# Patient Record
Sex: MALE | Race: WHITE | NOT HISPANIC OR LATINO | Employment: FULL TIME | URBAN - METROPOLITAN AREA
[De-identification: names, ages, dates, MRNs, and addresses within clinical notes are randomized per-mention and may not be internally consistent; named-entity substitution may affect disease eponyms.]

---

## 2021-02-03 ENCOUNTER — OFFICE VISIT (OUTPATIENT)
Dept: FAMILY MEDICINE | Facility: CLINIC | Age: 36
End: 2021-02-03

## 2021-02-03 VITALS
DIASTOLIC BLOOD PRESSURE: 90 MMHG | BODY MASS INDEX: 33.67 KG/M2 | RESPIRATION RATE: 20 BRPM | SYSTOLIC BLOOD PRESSURE: 124 MMHG | WEIGHT: 248.6 LBS | HEART RATE: 80 BPM | TEMPERATURE: 98.4 F | HEIGHT: 72 IN

## 2021-02-03 DIAGNOSIS — M54.41 ACUTE RIGHT-SIDED LOW BACK PAIN WITH RIGHT-SIDED SCIATICA: Primary | ICD-10-CM

## 2021-02-03 DIAGNOSIS — Z76.89 HEALTH CARE HOME: ICD-10-CM

## 2021-02-03 DIAGNOSIS — K52.9 CHRONIC DIARRHEA: ICD-10-CM

## 2021-02-03 DIAGNOSIS — Z71.89 ACP (ADVANCE CARE PLANNING): ICD-10-CM

## 2021-02-03 PROCEDURE — 99203 OFFICE O/P NEW LOW 30 MIN: CPT | Performed by: PHYSICIAN ASSISTANT

## 2021-02-03 RX ORDER — DIPHENHYDRAMINE HCL 25 MG
25 TABLET ORAL 3 TIMES DAILY
COMMUNITY
Start: 2021-02-03 | End: 2021-03-15

## 2021-02-03 RX ORDER — CYCLOBENZAPRINE HCL 10 MG
10 TABLET ORAL 3 TIMES DAILY PRN
Qty: 30 TABLET | Refills: 0 | Status: SHIPPED | OUTPATIENT
Start: 2021-02-03 | End: 2021-03-14

## 2021-02-03 RX ORDER — METHYLPREDNISOLONE 4 MG
TABLET, DOSE PACK ORAL
Qty: 21 TABLET | Refills: 0 | Status: SHIPPED | OUTPATIENT
Start: 2021-02-03 | End: 2021-03-22

## 2021-02-03 RX ORDER — LOPERAMIDE HYDROCHLORIDE 2 MG/1
2 TABLET ORAL 4 TIMES DAILY PRN
COMMUNITY
Start: 2021-02-03 | End: 2021-03-15

## 2021-02-03 SDOH — HEALTH STABILITY: MENTAL HEALTH: HOW OFTEN DO YOU HAVE A DRINK CONTAINING ALCOHOL?: 2-3 TIMES A WEEK

## 2021-02-03 SDOH — HEALTH STABILITY: MENTAL HEALTH: HOW OFTEN DO YOU HAVE 6 OR MORE DRINKS ON ONE OCCASION?: LESS THAN MONTHLY

## 2021-02-03 SDOH — HEALTH STABILITY: MENTAL HEALTH: HOW MANY STANDARD DRINKS CONTAINING ALCOHOL DO YOU HAVE ON A TYPICAL DAY?: 1 OR 2

## 2021-02-03 ASSESSMENT — MIFFLIN-ST. JEOR: SCORE: 2082.7

## 2021-02-03 NOTE — NURSING NOTE
Also to establish care.    Questioned patient about current smoking habits.  Pt. quit smoking some time ago.  PULSE regular  My Chart:   CLASSIFICATION OF OVERWEIGHT AND OBESITY BY BMI                        Obesity Class           BMI(kg/m2)  Underweight                                    < 18.5  Normal                                         18.5-24.9  Overweight                                     25.0-29.9  OBESITY                     I                  30.0-34.9                             II                 35.0-39.9  EXTREME OBESITY             III                >40                            Patient's  BMI Body mass index is 34.19 kg/m .  http://hin.nhlbi.nih.gov/menuplanner/menu.cgi  Pre-visit planning  Immunizations - needs Tdap  Colonoscopy -   Mammogram -   Asthma -   PHQ9 -    MCKENNA-7 -

## 2021-02-03 NOTE — PATIENT INSTRUCTIONS
Symptoms consistent with right low back muscle strain. Informed that we cannot rule out a disc injury without MRI. For now, recommended trial of cyclobenzaprine (Flexaril) muscle relaxant that pt can take up to 3 times daily. Warned them of possible side effects, including drowsiness, and no driving if drowsy. By tomorrow if he is not seeing improvement, recommended he start Medrol dose pack (steroid). Take with food. Follow instructions on package. Warned of side effects like drowsiness, jitteriness, nausea, diarrhea, constipation, weight changes, irritability, mood swings, and to contact me if noted.     Contact me in 1 week if not significantly better, or sooner with worsening. Would refer to ortho specialist at that time. Can contact me later this week if needing better pain control.     Will submit referral to [facility] for work-up on GI issues.     Deferred tetanus update today until future appt. Recommend return for fasting physical in 2-3 months.

## 2021-02-03 NOTE — PROGRESS NOTES
"CC: New patient, establish, back pain    History:  Back pain:  Starting this past summer, Nirav has been noticing more frequent tightening across lower back, where he could take ibuprofen, stretch, rest,  and seemed to go away. Now in the past 2 weeks, has been having more intense sharp stabbing pain on right low back at waistband. Is there to some degree constantly. Bending over makes it worse, as well as starting up after sitting for longer periods of time with get some numbness/tingling down lateral right thigh. Was relatively tolerable and was feeling better, but then this past weekend went and helped his brother move, and this exacerbated it to worse than it had ever been. Has been taking 1 Aleve daily, as he tries to avoid medication if possible. Has not been doing any heating, icing. Has been doing some gentle stretching.     Chronic diarrhea:  Has had somewhat persistent diarrhea for the past 15 years. Has tried gluten free, dairy free without relief. Takes loperamide daily, but occasionally will get constipated with this. Denies any blood. Has never seen a GI provider for this.     PMH, MEDICATIONS, ALLERGIES, SOCIAL AND FAMILY HISTORY in [facility] and reviewed by me personally.    ROS negative other than the symptoms noted above in the HPI.    Examination   BP (!) 124/90 (BP Location: Left arm, Patient Position: Chair, Cuff Size: Adult Large)   Pulse 80   Temp 98.4  F (36.9  C) (Oral)   Resp 20   Ht 1.816 m (5' 11.5\")   Wt 112.8 kg (248 lb 9.6 oz)   BMI 34.19 kg/m       Constitutional: Sitting comfortably, in no acute distress. Vital signs noted  Neck:  no adenopathy, trachea midline and normal to palpation, thyroid normal to palpation  Cardiovascular:  regular rate and rhythm, no murmurs, clicks, or gallops  Respiratory:  normal respiratory rate and rhythm, lungs clear to auscultation   M/S: ROM of back intact. Pain recreated with full flexion, right rotation, and right lateral lean. Mild tenderness to " palpation of Paraspinous muscles L4-L5 level.   NEURO:  muscle strength normal, sensation to light touch and pinprick normal, reflexes normal and symmetric  SKIN: No jaundice/pallor/rash.   Psychiatric: mentation appears normal and affect normal/bright      A/P    ICD-10-CM    1. Acute right-sided low back pain with right-sided sciatica  M54.41 cyclobenzaprine (FLEXERIL) 10 MG tablet     methylPREDNISolone (MEDROL DOSEPAK) 4 MG tablet therapy pack   2. Chronic diarrhea  K52.9 GASTROENTEROLOGY ADULT REF CONSULT ONLY   3. ACP (advance care planning)  Z71.89    4. Health Care Home  Z76.89        DISCUSSION:  Symptoms consistent with right low back muscle strain. Informed that we cannot rule out a disc injury without MRI. For now, recommended trial of cyclobenzaprine (Flexaril) muscle relaxant that pt can take up to 3 times daily. Warned him of possible side effects, including drowsiness, and no driving if drowsy. By tomorrow if he is not seeing improvement, recommended he start Medrol dose pack (steroid). Take with food. Follow instructions on package. Warned of side effects like drowsiness, jitteriness, nausea, diarrhea, constipation, weight changes, irritability, mood swings, and to contact me if noted.     Contact me in 1 week if not significantly better, or sooner with worsening. Would refer to ortho specialist at that time. Can contact me later this week if needing better pain control.     Will submit referral to [facility] for work-up on GI issues.     Deferred tetanus update today until future appt to avoid further symptoms. Recommend return for fasting physical in 2-3 months.     follow up visit: As needed    [facility]

## 2021-03-14 ENCOUNTER — HOSPITAL ENCOUNTER (EMERGENCY)
Facility: CLINIC | Age: 36
Discharge: HOME | End: 2021-03-14
Attending: EMERGENCY MEDICINE | Admitting: EMERGENCY MEDICINE
Payer: COMMERCIAL

## 2021-03-14 VITALS
SYSTOLIC BLOOD PRESSURE: 143 MMHG | OXYGEN SATURATION: 97 % | TEMPERATURE: 97.4 F | RESPIRATION RATE: 18 BRPM | DIASTOLIC BLOOD PRESSURE: 80 MMHG | HEART RATE: 77 BPM

## 2021-03-14 DIAGNOSIS — M54.41 ACUTE RIGHT-SIDED LOW BACK PAIN WITH RIGHT-SIDED SCIATICA: ICD-10-CM

## 2021-03-14 PROCEDURE — 250N000013 HC RX MED GY IP 250 OP 250 PS 637: Performed by: EMERGENCY MEDICINE

## 2021-03-14 PROCEDURE — 250N000011 HC RX IP 250 OP 636: Performed by: EMERGENCY MEDICINE

## 2021-03-14 PROCEDURE — 96372 THER/PROPH/DIAG INJ SC/IM: CPT | Performed by: EMERGENCY MEDICINE

## 2021-03-14 PROCEDURE — 99283 EMERGENCY DEPT VISIT LOW MDM: CPT

## 2021-03-14 RX ORDER — KETOROLAC TROMETHAMINE 15 MG/ML
15 INJECTION, SOLUTION INTRAMUSCULAR; INTRAVENOUS ONCE
Status: COMPLETED | OUTPATIENT
Start: 2021-03-14 | End: 2021-03-14

## 2021-03-14 RX ORDER — IBUPROFEN 800 MG/1
800 TABLET, FILM COATED ORAL EVERY 8 HOURS PRN
Qty: 60 TABLET | Refills: 0 | Status: SHIPPED | OUTPATIENT
Start: 2021-03-14 | End: 2021-03-22

## 2021-03-14 RX ORDER — LIDOCAINE 4 G/G
1 PATCH TOPICAL ONCE
Status: DISCONTINUED | OUTPATIENT
Start: 2021-03-14 | End: 2021-03-14 | Stop reason: HOSPADM

## 2021-03-14 RX ORDER — DIAZEPAM 2 MG
2 TABLET ORAL ONCE
Status: COMPLETED | OUTPATIENT
Start: 2021-03-14 | End: 2021-03-14

## 2021-03-14 RX ORDER — LIDOCAINE 50 MG/G
1 PATCH TOPICAL EVERY 24 HOURS
Qty: 10 PATCH | Refills: 0 | Status: SHIPPED | OUTPATIENT
Start: 2021-03-14 | End: 2022-04-15

## 2021-03-14 RX ORDER — ACETAMINOPHEN 500 MG
1000 TABLET ORAL ONCE
Status: COMPLETED | OUTPATIENT
Start: 2021-03-14 | End: 2021-03-14

## 2021-03-14 RX ORDER — CYCLOBENZAPRINE HCL 10 MG
10 TABLET ORAL ONCE
Status: COMPLETED | OUTPATIENT
Start: 2021-03-14 | End: 2021-03-14

## 2021-03-14 RX ORDER — CYCLOBENZAPRINE HCL 10 MG
5-10 TABLET ORAL 3 TIMES DAILY PRN
Qty: 20 TABLET | Refills: 0 | Status: SHIPPED | OUTPATIENT
Start: 2021-03-14 | End: 2022-04-15

## 2021-03-14 RX ORDER — DIAZEPAM 5 MG
5 TABLET ORAL EVERY 6 HOURS PRN
Qty: 12 TABLET | Refills: 0 | Status: SHIPPED | OUTPATIENT
Start: 2021-03-14 | End: 2021-03-16

## 2021-03-14 RX ADMIN — DIAZEPAM 2 MG: 2 TABLET ORAL at 07:54

## 2021-03-14 RX ADMIN — ACETAMINOPHEN 1000 MG: 500 TABLET, FILM COATED ORAL at 07:54

## 2021-03-14 RX ADMIN — LIDOCAINE 1 PATCH: 560 PATCH PERCUTANEOUS; TOPICAL; TRANSDERMAL at 08:45

## 2021-03-14 RX ADMIN — CYCLOBENZAPRINE HYDROCHLORIDE 10 MG: 10 TABLET, FILM COATED ORAL at 07:54

## 2021-03-14 RX ADMIN — KETOROLAC TROMETHAMINE 15 MG: 15 INJECTION, SOLUTION INTRAMUSCULAR; INTRAVENOUS at 07:54

## 2021-03-14 ASSESSMENT — ENCOUNTER SYMPTOMS
ABDOMINAL PAIN: 0
COUGH: 0
ARTHRALGIAS: 1
FEVER: 0

## 2021-03-14 NOTE — DISCHARGE INSTRUCTIONS
Start by taking ibuprofen 800 mg 3 times a day with meals.  You may also use lidocaine patches for 12 hours at a time but please allow for 12 hours afterwards before applying a new patch.  You may also use Flexeril 3 times a day to assist with muscle spasm.  Valium can be used at night or when relaxing at home for muscle spasms and to help you sleep.  Do not take Valium with oxycodone, other opioids or alcohol as this may restrict your breathing.  Please follow-up with your primary care physician or neurosurgery if you have continuing symptoms.  Please return to the emergency department with any new or concerning symptoms including weakness in your legs, bowel or bladder dysfunction, fever or any other serious symptoms.    Discharge Instructions  Back Pain  You were seen today for back pain. Back pain can have many causes, but most will get better without surgery or other specific treatment. Sometimes there is a herniated ( slipped ) disc. We do not usually do MRI scans to look for these right away, since most herniated discs will get better on their own with time.  Today, we did not find any evidence that your back pain was caused by a serious condition. However, sometimes symptoms develop over time and cannot be found during an emergency visit, so it is very important that you follow up with your primary provider.  Generally, every Emergency Department visit should have a follow-up clinic visit with either a primary or a specialty clinic/provider. Please follow-up as instructed by your emergency provider today.    Return to the Emergency Department if:  You develop a fever with your back pain.   You have weakness or change in sensation in one or both legs.  You lose control of your bowels or bladder, or cannot empty your bladder (cannot pee).  Your pain gets much worse.     Follow-up with your provider:  Unless your pain has completely gone away, please make an appointment with your provider within one week. Most  of the routine care for back pain is available in a clinic and not the Emergency Department. You may need further management of your back pain, such as more pain medication, imaging such as an X-ray or MRI, or physical therapy.    What can I do to help myself?  Remain Active -- People are often afraid that they will hurt their back further or delay recovery by remaining active, but this is one of the best things you can do for your back. In fact, staying in bed for a long time to rest is not recommended. Studies have shown that people with low back pain recover faster when they remain active. Movement helps to bring blood flow to the muscles and relieve muscle spasms as well as preventing loss of muscle strength.  Heat -- Using a heating pad can help with low back pain during the first few weeks. Do not sleep with a heating pad, as you can be burned.   Pain medications - You may take a pain medication such as Tylenol  (acetaminophen), Advil , Motrin  (ibuprofen) or Aleve  (naproxen).  If you were given a prescription for medicine here today, be sure to read all of the information (including the package insert) that comes with your prescription.  This will include important information about the medicine, its side effects, and any warnings that you need to know about.  The pharmacist who fills the prescription can provide more information and answer questions you may have about the medicine.  If you have questions or concerns that the pharmacist cannot address, please call or return to the Emergency Department.   Remember that you can always come back to the Emergency Department if you are not able to see your regular provider in the amount of time listed above, if you get any new symptoms, or if there is anything that worries you.

## 2021-03-14 NOTE — ED PROVIDER NOTES
History   Chief Complaint:  Hip Pain     HPI   Nirav Merritt is a 35 year old male who presents with right hip pain. The patient was see at Urgent Care 2 days ago for sciatic pain that he has been experiencing for the past month and he was given Gabapentin, Hydrocodone, Oxycodone, and Flexeril. He feels that it has worsened since then and is radiating down his right leg along with tingling in his right foot. He has not found relief with any of the medication. He last took 600 mg Gabapentin around 0200. He has been unable to sleep due to pain. He is unsure of what may have triggered the pain. He states he does not recall an inciting event however in review of clinic note states that he was helping his brother move last month which may have provoked his pain. He denies abdominal pain, cough or fever. Denies groin numbness, or bowel or bladder incontinence. Denies IV drug use. Denies previous back surgery. He reports previous back pain associated with muscle issues which since dissolved.     Review of Systems   Constitutional: Negative for fever.   Respiratory: Negative for cough.    Gastrointestinal: Negative for abdominal pain.   Genitourinary: Negative for enuresis.   Musculoskeletal: Positive for arthralgias.   All other systems reviewed and are negative.    Allergies:  Augmentin [Amoxicillin-Pot Clavulanate]    Medications:  Imodium  Gabapentin  Hydrocodone  Oxycodone  Flexeril    Past Medical History:    Patient denies any chronic conditions    Family History:    Mother- hyperlipidemia   Father- diabetes mellitus, type II    Social History:  Presents alone     Physical Exam     Patient Vitals for the past 24 hrs:   BP Temp Pulse Resp SpO2   03/14/21 0900 -- -- -- -- 94 %   03/14/21 0710 (!) 136/117 97.4  F (36.3  C) 100 16 98 %       Physical Exam   General: Patient is awake, alert and interactive when I enter the room. Appears uncomfortable.   Head: The scalp, face, and head appear normal. Atraumatic.   Neck:  Normal range of motion. No anterior cervical lymphadenopathy noted  CV: tachycardiac but regular   Resp: Lungs are clear without wheezes or rales. No respiratory distress.     MS: Normal tone. Joints grossly normal without effusions. No asymmetric leg swelling, calf or thigh tenderness.  Sensation is intact in the legs and pelvis including the lower sacral nerve roots.  They are tender in the right lumbar musculature area.  There is significant paraspinal muscle spasm.  There is no redness or edema about the back.  No midline spinal pain and no stepoffs. Detailed strength exam is performed in lower extremities, there is symmetrical strength in all myotomes tested both proximally and distally.   Skin: No rash or lesions noted. Normal capillary refill noted  Neuro:Speech is normal and fluent. Face is symmetric. Moving all extremities. No saddle anesthesia.   Psych:  Normal affect.  Appropriate interactions.    Emergency Department Course     Emergency Department Course:    Reviewed:  I reviewed nursing notes, vitals, past medical history and care everywhere    Assessments:  0724 I obtained history and examined the patient as noted above.   0806 I rechecked the patient, he is still feeling quite uncomfortable.  0843 Pain improved able to stretch out in bed.     Interventions:  0754: Valium 2 mg PO   0754: Toradol 15 mg IV   0754: Tylenol 1000 mg PO   0754: Flexeril 10 mg PO   0845: Lidocare 4% 1 Patch Transdermal    Disposition:  The patient was discharged to home.     Impression & Plan     Medical Decision Making:  Nirav Merritt is a 35 year old male who presents for evaluation of back pain and radicular symptoms. They have a history of back pain in the past. He states he does not recall an inciting event however in review of clinic note states that he was helping his brother move last month which may have provoked his pain. His pain has improved with interventions in the emergency department. He did not sustain any  trauma, therefore x-rays are not necessary due to the low likelihood of fracture or subluxation. Advanced imaging with CT/MRI is not indicated at this time, but may be indicated in the future if symptoms fail to resolve.  Nor is there any indication for consultation with neurosurgery or orthopedic spinal surgeon.  There is no clinical evidence of cauda equina syndrome, discitis, spinal/epidural space hematoma or epidural abscess or other emergently worrisome etiology. The neurological exam does not reveal any weakness, saddle anesthesia or sensory changes. The patient was advised that radiculopathy often takes significant time to resolve, and that follow up with primary care, neurology and/or neurosurgery will be indicated if symptoms do not improve. He will be discharged with pain medications to use as directed.  No heavy lifting, bending or twisting. Return if increasing pain, muscular weakness, or bowel or bladder dysfunction. I instructed the patient to stop taking opioid pain medications.      Diagnosis:    ICD-10-CM    1. Acute right-sided low back pain with right-sided sciatica  M54.41        Discharge Medications:  New Prescriptions    CYCLOBENZAPRINE (FLEXERIL) 10 MG TABLET    Take 0.5-1 tablets (5-10 mg) by mouth 3 times daily as needed for muscle spasms    DIAZEPAM (VALIUM) 5 MG TABLET    Take 1 tablet (5 mg) by mouth every 6 hours as needed for anxiety or sleep (muscle spasms)    IBUPROFEN (ADVIL/MOTRIN) 800 MG TABLET    Take 1 tablet (800 mg) by mouth every 8 hours as needed for moderate pain    LIDOCAINE (LIDODERM) 5 % PATCH    Place 1 patch onto the skin every 24 hours To prevent lidocaine toxicity, patient should be patch free for 12 hrs daily.       Scribe Disclosure:  Raissa SANCHEZ, am serving as a scribe at 7:13 AM on 3/14/2021 to document services personally performed by Geoffrey Turner MD based on my observations and the provider's statements to me.             Johnny  Geoffrey Underwood MD  03/14/21 0949

## 2021-03-14 NOTE — ED TRIAGE NOTES
Pt presents with R. Hip pain that radiates down his leg. Reports tingling in his R. Foot. States he has been having sciatic pain for the last month. Was seen at urgent care Friday and given gabapentin, hydrococodone, oxycodone, and flexeril. Woke up today with worsening pain and difficulty walking. ABCs intact.

## 2021-03-15 ENCOUNTER — OFFICE VISIT (OUTPATIENT)
Dept: FAMILY MEDICINE | Facility: CLINIC | Age: 36
End: 2021-03-15

## 2021-03-15 VITALS
BODY MASS INDEX: 33.86 KG/M2 | HEIGHT: 72 IN | DIASTOLIC BLOOD PRESSURE: 84 MMHG | OXYGEN SATURATION: 98 % | SYSTOLIC BLOOD PRESSURE: 122 MMHG | HEART RATE: 106 BPM | WEIGHT: 250 LBS | TEMPERATURE: 97.8 F

## 2021-03-15 DIAGNOSIS — M54.41 ACUTE RIGHT-SIDED LOW BACK PAIN WITH RIGHT-SIDED SCIATICA: Primary | ICD-10-CM

## 2021-03-15 PROCEDURE — 99213 OFFICE O/P EST LOW 20 MIN: CPT | Performed by: PHYSICIAN ASSISTANT

## 2021-03-15 RX ORDER — PREDNISONE 20 MG/1
TABLET ORAL
COMMUNITY
Start: 2021-03-11 | End: 2021-03-22

## 2021-03-15 ASSESSMENT — MIFFLIN-ST. JEOR: SCORE: 2089.05

## 2021-03-15 NOTE — PATIENT INSTRUCTIONS
Very concerned this is a disc herniation/bulge. Patient needs MRI of lumbar spine to determine this. I will order this through imaging group, and will work with insurance to try to get this approved. Will also refer to [facility] spine specialist- Dr. Quintero, Dr. Woods.

## 2021-03-15 NOTE — NURSING NOTE
Telephone call made to schedule Nirav Merritt for the following: MRI    Date: 3/16/2021  Time: 11:00 am  Place: [facility]
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
## 2021-03-15 NOTE — NURSING NOTE
Nirav is here for right hip pain that started a month ago that has progressed and is severe now. Pt is in a wheelchair today.        Pre-visit Screening:  Immunizations:  up to date  Colonoscopy:  NA  Mammogram: NA  Asthma Action Test/Plan:  NA  PHQ9:  NA phq2 done today  GAD7:  NA  Questioned patient about current smoking habits Pt. quit smoking some time ago.  Ok to leave detailed message on voice mail for today's visit only Yes, phone # [phone]

## 2021-03-16 RX ORDER — DIAZEPAM 5 MG
5 TABLET ORAL EVERY 6 HOURS PRN
Qty: 12 TABLET | Refills: 0 | Status: SHIPPED | OUTPATIENT
Start: 2021-03-16 | End: 2021-03-22

## 2021-03-18 ENCOUNTER — TELEPHONE (OUTPATIENT)
Dept: FAMILY MEDICINE | Facility: CLINIC | Age: 36
End: 2021-03-18

## 2021-03-18 NOTE — TELEPHONE ENCOUNTER
Pt called with questions in regards to his Valium. He is curious on how to wean off this medication. He would like to discuss this with you.    Please advise # [phone]    Thanks, Umu

## 2021-03-18 NOTE — CONFIDENTIAL NOTE
Called and spoke to Nirav. Scheduled for microdiscectomy in 1 week. Will schedule pre-op for M/Tu.   He has successfully weaned off Valium. Dr. Woods wanted him to avoid medication from now until surgery.

## 2021-03-22 ENCOUNTER — OFFICE VISIT (OUTPATIENT)
Dept: FAMILY MEDICINE | Facility: CLINIC | Age: 36
End: 2021-03-22

## 2021-03-22 VITALS
OXYGEN SATURATION: 98 % | SYSTOLIC BLOOD PRESSURE: 130 MMHG | TEMPERATURE: 97.2 F | HEIGHT: 72 IN | DIASTOLIC BLOOD PRESSURE: 84 MMHG | BODY MASS INDEX: 32.1 KG/M2 | HEART RATE: 90 BPM | WEIGHT: 237 LBS

## 2021-03-22 DIAGNOSIS — M51.26 PROTRUSION OF LUMBAR INTERVERTEBRAL DISC: ICD-10-CM

## 2021-03-22 DIAGNOSIS — Z01.818 PRE-OPERATIVE EXAMINATION: Primary | ICD-10-CM

## 2021-03-22 DIAGNOSIS — M54.41 ACUTE RIGHT-SIDED LOW BACK PAIN WITH RIGHT-SIDED SCIATICA: ICD-10-CM

## 2021-03-22 LAB
ERYTHROCYTE [DISTWIDTH] IN BLOOD BY AUTOMATED COUNT: 12.7 %
HCT VFR BLD AUTO: 54.4 % (ref 40–53)
HEMOGLOBIN: 17.9 G/DL (ref 13.3–17.7)
MCH RBC QN AUTO: 30.1 PG (ref 26–33)
MCHC RBC AUTO-ENTMCNC: 32.9 G/DL (ref 31–36)
MCV RBC AUTO: 91.6 FL (ref 78–100)
PLATELET COUNT - QUEST: 229 10^9/L (ref 150–375)
RBC # BLD AUTO: 5.94 10*12/L (ref 4.4–5.9)
WBC # BLD AUTO: 7.7 10*9/L (ref 4–11)

## 2021-03-22 PROCEDURE — 85027 COMPLETE CBC AUTOMATED: CPT | Performed by: PHYSICIAN ASSISTANT

## 2021-03-22 PROCEDURE — 36415 COLL VENOUS BLD VENIPUNCTURE: CPT | Performed by: PHYSICIAN ASSISTANT

## 2021-03-22 PROCEDURE — 99214 OFFICE O/P EST MOD 30 MIN: CPT | Performed by: PHYSICIAN ASSISTANT

## 2021-03-22 RX ORDER — ACETAMINOPHEN 500 MG
1000 TABLET ORAL EVERY 6 HOURS PRN
COMMUNITY
End: 2022-04-15

## 2021-03-22 RX ORDER — GABAPENTIN 300 MG/1
CAPSULE ORAL
COMMUNITY
Start: 2021-03-13 | End: 2022-10-17

## 2021-03-22 ASSESSMENT — MIFFLIN-ST. JEOR: SCORE: 2030.08
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
## 2021-06-20 ENCOUNTER — HEALTH MAINTENANCE LETTER (OUTPATIENT)
Age: 36
End: 2021-06-20

## 2021-10-11 ENCOUNTER — HEALTH MAINTENANCE LETTER (OUTPATIENT)
Age: 36
End: 2021-10-11

## 2022-04-15 ENCOUNTER — OFFICE VISIT (OUTPATIENT)
Dept: FAMILY MEDICINE | Facility: CLINIC | Age: 37
End: 2022-04-15

## 2022-04-15 VITALS — SYSTOLIC BLOOD PRESSURE: 146 MMHG | RESPIRATION RATE: 20 BRPM | HEART RATE: 72 BPM | DIASTOLIC BLOOD PRESSURE: 92 MMHG

## 2022-04-15 DIAGNOSIS — S39.012A BACK STRAIN, INITIAL ENCOUNTER: Primary | ICD-10-CM

## 2022-04-15 PROCEDURE — 99213 OFFICE O/P EST LOW 20 MIN: CPT | Performed by: PHYSICIAN ASSISTANT

## 2022-04-15 RX ORDER — METHOCARBAMOL 750 MG/1
750 TABLET, FILM COATED ORAL 4 TIMES DAILY PRN
Qty: 30 TABLET | Refills: 0 | Status: SHIPPED | OUTPATIENT
Start: 2022-04-15 | End: 2022-04-22

## 2022-04-15 NOTE — LETTER
[facility]  [address], [address]  [place]  [address]    April 15, 2022        Nirav Merritt  [address] [facility] [address]              To Whom It May Concern,    Nirav Merritt is being treated for an acute injury under my care. Please excuse him from work 4/15/22-4/22/22 while he recovers from this illness. He may return to work without restriction after 4/22/22.       If you have any further questions or problems, please contact our office at [phone].          Mark Duenas PA-C

## 2022-04-15 NOTE — NURSING NOTE
Nirav CERNA Shaina is here for back pain. Had surgery on it about 1 year ago. Went to an event yesterday and was on his feet and moving heavy objects. Feels that his back is spasm now. Pain is different from last year, not nerve.

## 2022-04-15 NOTE — PROGRESS NOTES
Assessment & Plan     Back strain, initial encounter-encouraged by lack of neurological deficits/weakness and negative straight leg raise test-supports diagnosis of muscle strain. Will treat for muscle strain and watch for any concerning signs of symptoms    - methocarbamol (ROBAXIN) 750 MG tablet  Dispense: 30 tablet; Refill: 0   Cautioned to not operate heavy machinery or work while taking this medication  600mg Ibuprofen and Tylenol 1000mg every 8 hours. Do not exceed this dosage-alternate these dosages every 4hrs  Work through back pain as much as possible-do not over rest back strain  Work note written to excuse from work through 4/22/22  Ice/heat back as tolerated to reduce inflammation/relax area  Consider follow up with surgeon if any leg weakness/numbness develops or if symptoms do not improve. Warned to go to ED if you experience any bowel/bladder dysfunction of saddle anesthesia.     Follow up as needed    No follow-ups on file.    Mark Duenas PA-C  [facility]    Subjective   Nirav is a 36 year old who presents for the following health issues     HPI     Patient has history of L5-S1 herniated disc surgery 10/21. He has been doing overall well since this, but does note that he injured his back catching a falling cart at work a few months ago. He only had muscle soreness in his back with this along with general sensitivity/tenderness with twisting his back. 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 Patient denies numbness/tingling/weakness of any leg/arm, also denies any saddle anesthesia or bowel/bladder dysfunction. He tried to use gabapentin for this issue with no relief of his symptoms. Patient denies fevers/chills. He has pain with going from sitting to standing and with twisting his back.    Review of Systems   Constitutional, HEENT, cardiovascular, pulmonary,  gi and gu systems are negative, except as otherwise noted.      Objective    BP (!) 146/92 (BP Location: Right arm, Patient Position: Chair, Cuff Size: Adult Regular)   Pulse 72   Resp 20   There is no height or weight on file to calculate BMI.  Physical Exam   GENERAL: healthy, alert and appears to be in moderate pain  EYES: Eyes grossly normal to inspection, PERRL and conjunctivae and sclerae normal  HENT: ear canals and TM's normal, nose and mouth without ulcers or lesions  NECK: no adenopathy, no asymmetry, masses, or scars and thyroid normal to palpation  RESP: lungs clear to auscultation - no rales, rhonchi or wheezes  CV: regular rate and rhythm, normal S1 S2, no S3 or S4, no murmur, click or rub, no peripheral edema and peripheral pulses strong  ABDOMEN: soft, nontender, no hepatosplenomegaly, no masses and bowel sounds normal  MS: no gross musculoskeletal defects noted, no edema  NEURO: Normal strength and tone, mentation intact and speech normal  BACK: no CVA tenderness, no paralumbar tenderness, negative straight leg raise, normal ROM in neck, some pain in lower back with flexion of neck  PSYCH: mentation appears normal, affect normal/bright

## 2022-07-17 ENCOUNTER — HEALTH MAINTENANCE LETTER (OUTPATIENT)
Age: 37
End: 2022-07-17

## 2022-09-25 ENCOUNTER — HEALTH MAINTENANCE LETTER (OUTPATIENT)
Age: 37
End: 2022-09-25

## 2022-10-17 ENCOUNTER — OFFICE VISIT (OUTPATIENT)
Dept: FAMILY MEDICINE | Facility: CLINIC | Age: 37
End: 2022-10-17

## 2022-10-17 VITALS
HEIGHT: 71 IN | WEIGHT: 254.8 LBS | OXYGEN SATURATION: 99 % | DIASTOLIC BLOOD PRESSURE: 78 MMHG | HEART RATE: 80 BPM | BODY MASS INDEX: 35.67 KG/M2 | RESPIRATION RATE: 16 BRPM | TEMPERATURE: 97.9 F | SYSTOLIC BLOOD PRESSURE: 138 MMHG

## 2022-10-17 DIAGNOSIS — S39.012A BACK STRAIN, INITIAL ENCOUNTER: Primary | ICD-10-CM

## 2022-10-17 PROCEDURE — 99214 OFFICE O/P EST MOD 30 MIN: CPT | Performed by: PHYSICIAN ASSISTANT

## 2022-10-17 RX ORDER — CYCLOBENZAPRINE HCL 10 MG
10 TABLET ORAL 3 TIMES DAILY PRN
Qty: 30 TABLET | Refills: 0 | Status: SHIPPED | OUTPATIENT
Start: 2022-10-17 | End: 2023-09-22

## 2022-10-17 ASSESSMENT — PAIN SCALES - GENERAL: PAINLEVEL: MILD PAIN (3)

## 2022-10-17 NOTE — NURSING NOTE
Chief Complaint   Patient presents with     Back Pain     Pt state he been having some back pain - Lower back it radiate to his L Leg - pain has been going on for the past two weeks- Pt had a back surgery back in  March 2020 L5 LS1 - Pt hurt his back by lifting something heavy    Pre-visit Screening:  Immunizations:  not up to date -   Colonoscopy: N/A  Mammogram:N/A  Asthma Action Test/Plan: N/A  PHQ9:Done today   GAD7: Done today   Questioned patient about current smoking habits Pt. has never smoked.  Ok to leave detailed message on voice mail for today's visit only Yes, phone # [phone]

## 2022-10-17 NOTE — PROGRESS NOTES
"  Assessment & Plan     Back strain, initial encounter-reassured by exam that this is back strain without nerve involvement, at least at time of exam. If symptoms worsen, patient will contact surgeon for evaluation, otherwise will treat conservatively.  Ice/heat on area of pain, ibuprofen 400mg TID, Tylenol 1000mg TID  Stretch back daily  Continue to complete ADLs-do not rest back too much  - cyclobenzaprine (FLEXERIL) 10 MG tablet  Dispense: 30 tablet; Refill: 0      Follow up as needed    No follow-ups on file.    Mark Duenas PA-C  [facility]    Subjective   Nirav is a 37 year old, presenting for the following health issues:  Back Pain (Pt state he been having some back pain - Lower back it radiate to his L Leg - pain has been going on for the past two weeks- Pt had a back surgery back in  March 25 , 2021 L5 LS1 @ [facility] - Pt hurt his back by lifting something heavy )      HPI     Pain in lower back started 2 weeks ago. 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 No nerve involvement at this time, more MSK pain per patient. This past Friday, patient noticed some nerve involvement including tingling in back with sciatic nerve pain down L leg. Over the weekend, tingling lessened but still getting shooting pain down L leg intermittently. Hasn't taken anything for the pain. No leg/arm weakness. No bowel/bladder dysfunction at this time. Sensation is normal in legs per patient.       Review of Systems   Constitutional, HEENT, cardiovascular, pulmonary, gi and gu systems are negative, except as otherwise noted.      Objective    /78 (BP Location: Right arm, Patient Position: Sitting, Cuff Size: Adult Large)   Pulse 80   Temp 97.9  F (36.6  C) (Tympanic)   Resp 16   Ht 1.803 m (5' 11\")   Wt 115.6 kg (254 lb 12.8 oz)   SpO2 99%   BMI 35.54 kg/m    Body mass index is 35.54 kg/m .  Physical Exam   GENERAL: healthy, alert and no distress  NECK: no " PHYSICAL THERAPY - DAILY TREATMENT NOTE     Patient Name: Fifi Mcfarland        Date: 10/26/2020  : 1942   YES Patient  Verified  Visit #:     Insurance: Payor: [place] Failing / Plan: [place] MED[place]ARE PART A & B / Product Type: Medicare /      In time: 744 Out time:    Total Treatment Time: 45     Medicare/BCBS Time Tracking (below)   Total Timed Codes (min):  30 1:1 Treatment Time:  30     TREATMENT AREA =  Low back pain [M54.5]    SUBJECTIVE    Pain Level (on 0 to 10 scale): 4 / 10   Medication Changes/New allergies or changes in medical history, any new surgeries or procedures? NO    If yes, update Summary List   Subjective Functional Status/Changes:  []  No changes reported     Pt notes she tried doing more walking since last visit and has noticed increased pain. She reports pain at left lateral back and lumbar region. OBJECTIVE  Modalities Rationale:     decrease pain to improve patient's ability to perform work, ADLs with less pain     15 min [x] Estim, type/location:  Lumbar region in supine                                     []  att     [x]  unatt     []  w/US     []  w/ice    []  w/heat    min []  Mechanical Traction: type/lbs                   []  pro   []  sup   []  int   []  cont    []  before manual    []  after manual    min []  Ultrasound, settings/location:      min []  Iontophoresis w/ dexamethasone, location:                                               []  take home patch       []  in clinic    min []  Ice     []  Heat    location/position:     min []  Vasopneumatic Device, press/temp:     min []  Other:    [x] Skin assessment post-treatment (if applicable):    [x]  intact    []  redness- no adverse reaction     []redness - adverse reaction:      15 min Manual Therapy: STM, MFR to L lumbar region in sidelying and supine   Rationale:      increase ROM and increase strength to improve the patients ability to perform ADLs, work with less pain.        15 min Therapeutic Exercise:  [x]  See flow sheet   Rationale:      increase ROM and increase strength to improve the patients ability to perform ADLs, work with less pain. Billed With/As:   [x] TE   [] TA   [] Neuro   [] Self Care Patient Education: [x] Review HEP    [] Progressed/Changed HEP based on:   [] positioning   [] body mechanics   [] transfers   [] heat/ice application    [x] other: HEP progression, modifying HEP to reduce pain and emphasis on alternating days of strengthening and walking activity. Other Objective/Functional Measures:  Pt with TTP at R QL, gluteus medius. Pt with ed on stretching and modifying activity to reduce pain. Post Treatment Pain Level (on 0 to 10) scale:   0-1 / 10     ASSESSMENT    Assessment/Changes in Function:   Pt with significant improvement in pain post tx.       []  See Progress Note/Recertification   Patient will continue to benefit from skilled PT services to modify and progress therapeutic interventions, address functional mobility deficits, address ROM deficits, address strength deficits, analyze and address soft tissue restrictions, analyze and cue movement patterns and analyze and modify body mechanics/ergonomics to attain remaining goals. Progress toward goals / Updated goals:    4. Pt will be able to perform sit<>stand and squatting with < 3/10 pain to improve function--good progress overall, limited today secondary to increased LBP. PLAN    [x]  Upgrade activities as tolerated YES Continue plan of care   []  Discharge due to :    []  Other: Good progress overall, 1 visit scheduled PRN to reassess response to updated HEP and modification of activity.      Therapist: Rivas Don PT    Date: 10/26/2020 Time: 11:45 am     Future Appointments   Date Time Provider Deanna Yanez   10/26/2020 11:00 AM Arslan Giraldo PT [facility] adenopathy, no asymmetry, masses, or scars and thyroid normal to palpation  RESP: lungs clear to auscultation - no rales, rhonchi or wheezes  CV: regular rate and rhythm, normal S1 S2, no S3 or S4, no murmur, click or rub, no peripheral edema and peripheral pulses strong  ABDOMEN: soft, nontender, no hepatosplenomegaly, no masses and bowel sounds normal  MS: no gross musculoskeletal defects noted, no edema  SKIN: no suspicious lesions or rashes  NEURO: Normal strength and tone, mentation intact and speech normal  Comprehensive back pain exam:  No tenderness, Lower extremity sensation normal and equal on both sides and Straight leg raise negative bilaterally

## 2023-08-05 ENCOUNTER — HEALTH MAINTENANCE LETTER (OUTPATIENT)
Age: 38
End: 2023-08-05

## 2023-09-20 ENCOUNTER — APPOINTMENT (OUTPATIENT)
Dept: MRI IMAGING | Facility: CLINIC | Age: 38
End: 2023-09-20
Attending: NURSE PRACTITIONER
Payer: COMMERCIAL

## 2023-09-20 ENCOUNTER — HOSPITAL ENCOUNTER (EMERGENCY)
Facility: CLINIC | Age: 38
Discharge: HOME | End: 2023-09-20
Attending: EMERGENCY MEDICINE | Admitting: EMERGENCY MEDICINE
Payer: COMMERCIAL

## 2023-09-20 ENCOUNTER — APPOINTMENT (OUTPATIENT)
Dept: GENERAL RADIOLOGY | Facility: CLINIC | Age: 38
End: 2023-09-20
Attending: EMERGENCY MEDICINE
Payer: COMMERCIAL

## 2023-09-20 VITALS
WEIGHT: 248.9 LBS | RESPIRATION RATE: 18 BRPM | DIASTOLIC BLOOD PRESSURE: 90 MMHG | HEART RATE: 64 BPM | BODY MASS INDEX: 34.71 KG/M2 | TEMPERATURE: 97.7 F | OXYGEN SATURATION: 96 % | SYSTOLIC BLOOD PRESSURE: 138 MMHG

## 2023-09-20 DIAGNOSIS — R41.840 DIFFICULTY CONCENTRATING: ICD-10-CM

## 2023-09-20 DIAGNOSIS — R41.82 ALTERED MENTAL STATUS, UNSPECIFIED ALTERED MENTAL STATUS TYPE: ICD-10-CM

## 2023-09-20 LAB
ANION GAP SERPL CALCULATED.3IONS-SCNC: 14 MMOL/L (ref 7–15)
BASOPHILS # BLD AUTO: 0 10E3/UL (ref 0–0.2)
BASOPHILS NFR BLD AUTO: 0 %
BUN SERPL-MCNC: 16.4 MG/DL (ref 6–20)
CALCIUM SERPL-MCNC: 9.5 MG/DL (ref 8.6–10)
CHLORIDE SERPL-SCNC: 104 MMOL/L (ref 98–107)
CREAT SERPL-MCNC: 0.88 MG/DL (ref 0.67–1.17)
DEPRECATED HCO3 PLAS-SCNC: 23 MMOL/L (ref 22–29)
EGFRCR SERPLBLD CKD-EPI 2021: >90 ML/MIN/1.73M2
EOSINOPHIL # BLD AUTO: 0.2 10E3/UL (ref 0–0.7)
EOSINOPHIL NFR BLD AUTO: 3 %
ERYTHROCYTE [DISTWIDTH] IN BLOOD BY AUTOMATED COUNT: 12.5 % (ref 10–15)
GLUCOSE BLDC GLUCOMTR-MCNC: 115 MG/DL (ref 70–99)
GLUCOSE SERPL-MCNC: 106 MG/DL (ref 70–99)
HCT VFR BLD AUTO: 45.5 % (ref 40–53)
HGB BLD-MCNC: 15.9 G/DL (ref 13.3–17.7)
HOLD SPECIMEN: NORMAL
HOLD SPECIMEN: NORMAL
IMM GRANULOCYTES # BLD: 0 10E3/UL
IMM GRANULOCYTES NFR BLD: 0 %
LYMPHOCYTES # BLD AUTO: 2.1 10E3/UL (ref 0.8–5.3)
LYMPHOCYTES NFR BLD AUTO: 31 %
MAGNESIUM SERPL-MCNC: 1.8 MG/DL (ref 1.7–2.3)
MCH RBC QN AUTO: 30.6 PG (ref 26.5–33)
MCHC RBC AUTO-ENTMCNC: 34.9 G/DL (ref 31.5–36.5)
MCV RBC AUTO: 88 FL (ref 78–100)
MONOCYTES # BLD AUTO: 0.5 10E3/UL (ref 0–1.3)
MONOCYTES NFR BLD AUTO: 8 %
NEUTROPHILS # BLD AUTO: 4 10E3/UL (ref 1.6–8.3)
NEUTROPHILS NFR BLD AUTO: 58 %
NRBC # BLD AUTO: 0 10E3/UL
NRBC BLD AUTO-RTO: 0 /100
PLATELET # BLD AUTO: 304 10E3/UL (ref 150–450)
POTASSIUM SERPL-SCNC: 3.9 MMOL/L (ref 3.4–5.3)
RBC # BLD AUTO: 5.2 10E6/UL (ref 4.4–5.9)
SODIUM SERPL-SCNC: 141 MMOL/L (ref 136–145)
WBC # BLD AUTO: 6.9 10E3/UL (ref 4–11)

## 2023-09-20 PROCEDURE — 36415 COLL VENOUS BLD VENIPUNCTURE: CPT | Performed by: EMERGENCY MEDICINE

## 2023-09-20 PROCEDURE — 82962 GLUCOSE BLOOD TEST: CPT

## 2023-09-20 PROCEDURE — 80048 BASIC METABOLIC PNL TOTAL CA: CPT | Performed by: EMERGENCY MEDICINE

## 2023-09-20 PROCEDURE — 70549 MR ANGIOGRAPH NECK W/O&W/DYE: CPT

## 2023-09-20 PROCEDURE — 255N000002 HC RX 255 OP 636: Performed by: NURSE PRACTITIONER

## 2023-09-20 PROCEDURE — A9585 GADOBUTROL INJECTION: HCPCS | Performed by: NURSE PRACTITIONER

## 2023-09-20 PROCEDURE — 83735 ASSAY OF MAGNESIUM: CPT | Performed by: EMERGENCY MEDICINE

## 2023-09-20 PROCEDURE — 85025 COMPLETE CBC W/AUTO DIFF WBC: CPT | Performed by: EMERGENCY MEDICINE

## 2023-09-20 PROCEDURE — 70553 MRI BRAIN STEM W/O & W/DYE: CPT

## 2023-09-20 PROCEDURE — 99285 EMERGENCY DEPT VISIT HI MDM: CPT | Mod: 25

## 2023-09-20 PROCEDURE — 70544 MR ANGIOGRAPHY HEAD W/O DYE: CPT | Mod: XU

## 2023-09-20 PROCEDURE — 93005 ELECTROCARDIOGRAM TRACING: CPT | Mod: RTG

## 2023-09-20 PROCEDURE — 71046 X-RAY EXAM CHEST 2 VIEWS: CPT

## 2023-09-20 PROCEDURE — 99207 PR NO CHARGE LOS: CPT | Performed by: NURSE PRACTITIONER

## 2023-09-20 RX ORDER — GADOBUTROL 604.72 MG/ML
10 INJECTION INTRAVENOUS ONCE
Status: COMPLETED | OUTPATIENT
Start: 2023-09-20 | End: 2023-09-20

## 2023-09-20 RX ADMIN — GADOBUTROL 10 ML: 604.72 INJECTION INTRAVENOUS at 17:20

## 2023-09-20 ASSESSMENT — ACTIVITIES OF DAILY LIVING (ADL)
ADLS_ACUITY_SCORE: 35

## 2023-09-20 NOTE — CONSULTS
"  [facility]    Stroke Telephone Note    I was called by Lillie Waldrop on 09/20/23 regarding patient Nirav Merritt. The patient is a 38 year old male without significant PMHx who was sent from urgent care after presenting with headache and difficulty focusing; this came on rather acutely. Per ED provider, he does not have any focal deficits.     BP (!) 125/118   Pulse 78   Temp 97.7  F (36.5  C) (Temporal)   Resp 18   Wt 112.9 kg (248 lb 14.4 oz)   SpO2 96%   BMI 34.71 kg/m       Imaging Findings  Pending     Impression  Acute headache and inattention, unclear etiology     Recommendations  - toxic/metabolic work up in progress  - MRI/MRA to r/o intracranial pathology (ordered)  - if MRI shows stroke, please admit for workup and place IP stroke consult    Case discussed with vascular neurology attending Dr. Park.    My recommendations are based on the information provided over the phone by Nirav Merritt's in-person providers. They are not intended to replace the clinical judgment of his in-person providers. I was not requested to personally see or examine the patient at this time.    Tila Huerta NP  Vascular Neurology    To page me or covering stroke neurology team member, click here: AMCOM  Choose \"On Call\" tab at top, then select \"NEUROLOGY/ALL SITES\" from middle drop-down box, press Enter, then look for \"stroke\" or \"telestroke\" for your site.   "

## 2023-09-20 NOTE — ED PROVIDER NOTES
History     Chief Complaint:  Altered Mental Status       The history is provided by the patient.      Nirav Merritt is a 38 year old male who presents with confusion and difficulty completing tasks, beginning at 9 am while working today. He also reports some lightheadedness and headache, developing over time. He reports he noticed himself losing his train of thought, forgetting what he was doing, and having trouble returning to tasks. He was seen in urgent care and referred here. He denies speech changes. Reports his extremities are working normally. He ambulated here from urgent care.    Independent Historian:   None - Patient Only    Review of External Notes:   I reviewed the urgent care note from earlier today.     Medications:    Flexeril    Past Medical History:    Concussion    Physical Exam   Patient Vitals for the past 24 hrs:   BP Temp Temp src Pulse Resp SpO2 Weight   09/20/23 1254 -- 97.7  F (36.5  C) Temporal -- -- -- 112.9 kg (248 lb 14.4 oz)   09/20/23 1250 (!) 125/118 -- -- -- -- -- --   09/20/23 1247 -- -- -- 78 18 96 % --        Gen: No distress.  Nontoxic.  Head: Atraumatic.  No hematomas, lesions, abrasions  Neck: No midline tenderness of the spine.  Mouth: No oral lesions, or abrasions.  Mucous membranes are moist.  Resp: Equal breath sounds, normal respiratory effort  Cardio: Regular rate and rhythm, no murmurs  Abdomen: Soft, nontender, nondistended  MSK; no extremity swelling, bruising, or abrasions.  Neuro: Cranial nerves II through XII intact.  5 out of 5 muscle strength in the upper and lower extremities.  Sensation intact in the upper and lower extremities.  Finger-to-nose negative.  Heel-to-shin negative.  No truncal ataxia.  Psych: Appropriate affect.     Emergency Department Course   ECG:  ECG results from 09/20/23   EKG 12 lead     Value    Systolic Blood Pressure     Diastolic Blood Pressure     Ventricular Rate 68    Atrial Rate 68    MD Interval 242    QRS Duration 116         QTc 414    P Axis 55    R AXIS 32    T Axis 58    Interpretation ECG      Sinus rhythm with 1st degree A-V block  Otherwise normal ECG  No previous ECGs available         Imaging:  Chest XR,  PA & LAT   Final Result   IMPRESSION: Heart is normal in size. Lungs are clear.      MARISELA MALONE MD            SYSTEM ID:  [id number]      MR Brain w/o & w Contrast    (Results Pending)   MRA Brain ([place]) wo & w Contrast    (Results Pending)   MRA Neck (Carotids) wo & w Contrast    (Results Pending)      Report per radiology    Laboratory:  Labs Ordered and Resulted from Time of ED Arrival to Time of ED Departure   BASIC METABOLIC PANEL - Abnormal       Result Value    Sodium 141      Potassium 3.9      Chloride 104      Carbon Dioxide (CO2) 23      Anion Gap 14      Urea Nitrogen 16.4      Creatinine 0.88      Calcium 9.5      Glucose 106 (*)     GFR Estimate >90     GLUCOSE BY METER - Abnormal    GLUCOSE BY METER POCT 115 (*)    MAGNESIUM - Normal    Magnesium 1.8     CBC WITH PLATELETS AND DIFFERENTIAL    WBC Count 6.9      RBC Count 5.20      Hemoglobin 15.9      Hematocrit 45.5      MCV 88      MCH 30.6      MCHC 34.9      RDW 12.5      Platelet Count 304      % Neutrophils 58      % Lymphocytes 31      % Monocytes 8      % Eosinophils 3      % Basophils 0      % Immature Granulocytes 0      NRBCs per 100 WBC 0      Absolute Neutrophils 4.0      Absolute Lymphocytes 2.1      Absolute Monocytes 0.5      Absolute Eosinophils 0.2      Absolute Basophils 0.0      Absolute Immature Granulocytes 0.0      Absolute NRBCs 0.0        Emergency Department Course & Assessments:       Interventions:  Medications - No data to display     Independent Interpretation (X-rays, CTs, rhythm strip):  None    Assessments/Consultations/Discussion of Management or Tests:  ED Course as of 09/20/23 1558   Wed Sep 20, 2023   1257 I obtained the history and examined the patient as noted above.    1313 I spoke with Tila Huerta CNP,  stroke/neurology, regarding the patient.   1512 I rechecked and updated the patient.        Social Determinants of Health affecting care:   None    Disposition:  The patient was handed off to Dr. Bergeron pending MRI results.    Impression & Plan    CMS Diagnoses: None  The patient has stroke symptoms:         ED Stroke specific documentation           NIHSS PDF     Patient last known well time: 0900  ED Provider first to bedside at: 1257      Thrombolytics:   Not given due to:   - minor/isolated/quickly resolving symptoms    If treating with thrombolytics: Ensure SBP<180 and DBP<105 prior to treatment with thrombolytics.  Administering thrombolytics after treatment with IV labetalol, hydralazine, or nicardipine is reasonable once BP control is established.    Endovascular Retrieval:  Not initiated due to absence of proximal vessel occlusion    National Institutes of Health Stroke Scale (Baseline)  Time Performed: 1257     Score    Level of consciousness: (0)   Alert, keenly responsive    LOC questions: (0)   Answers both questions correctly    LOC commands: (0)   Performs both tasks correctly    Best gaze: (0)   Normal    Visual: (0)   No visual loss    Facial palsy: (0)   Normal symmetrical movements    Motor arm (left): (0)   No drift    Motor arm (right): (0)   No drift    Motor leg (left): (0)   No drift    Motor leg (right): (0)   No drift    Limb ataxia: (0)   Absent    Sensory: (0)   Normal- no sensory loss    Best language: (0)   Normal- no aphasia    Dysarthria: (0)   Normal    Extinction and inattention: (0)   No abnormality        Total Score:  0        Stroke Mimics were considered (including migraine headache, seizure disorder, hypoglycemia (or hyperglycemia), head or spinal trauma, CNS infection, Toxin ingestion and shock state (e.g. sepsis) .      Medical Decision Makin-year-old otherwise healthy male presents to the emergency department with a complaint of difficulty concentrating, difficulty  completing tasks, and headache.  Patient reports that he went to urgent care, and they recommended that he come here for a stroke work-up.  Patient reports that he walked over here from the urgent care.  Patient reports that he noticed his symptoms at 9 AM this morning, 4 hours prior to arrival.   On patient's physical exam, he is answering questions appropriately.  He is able to identify objects.  He is completing tasks and following directions.  He does not have any unilateral numbness or weakness.  He does not have a facial droop or slurred speech.  I did call and speak with the stroke team and they recommend an MRI, and discharge if the imaging looks okay.  I did recheck on the patient, and he states that he is feeling better.  He was filling out the MRI consent forms, and was not having any troubles.  Patient is handed off to Dr. Joel Bergeron pending MRI results.    Diagnosis:    ICD-10-CM    1. Difficulty concentrating  R41.840            Discharge Medications:  New Prescriptions    No medications on file        Scribe Disclosure:  I, Lorenza Frey, am serving as a scribe at 1:14 PM on 9/20/2023 to document services personally performed by Lillie Waldrop MD based on my observations and the provider's statements to me.     9/20/2023   Lillie Waldrop MD Richardson, Elizabeth, MD  09/20/23 6100

## 2023-09-20 NOTE — ED TRIAGE NOTES
Woke up feeling fine. Getting ready for the day, trying to make phone calls and sending emails. Was having difficulty completing his tasks. Pt c/o nausea, dizziness, headache. Losing train of thought, would get frustrated, have to stop and try and figure out what he was doing, but couldn't finish the task.   Sent from  due to acute neurologic change.

## 2023-09-20 NOTE — DISCHARGE INSTRUCTIONS
Please return to the emergency department if your symptoms increase, you experience facial droop, slurred speech, one-sided numbness or weakness in your body.    Please drink plenty of fluids.  Please follow-up with your primary care provider in the next 2 days.

## 2023-09-21 LAB
ATRIAL RATE - MUSE: 68 BPM
DIASTOLIC BLOOD PRESSURE - MUSE: NORMAL MMHG
INTERPRETATION ECG - MUSE: NORMAL
P AXIS - MUSE: 55 DEGREES
PR INTERVAL - MUSE: 242 MS
QRS DURATION - MUSE: 116 MS
QT - MUSE: 390 MS
QTC - MUSE: 414 MS
R AXIS - MUSE: 32 DEGREES
SYSTOLIC BLOOD PRESSURE - MUSE: NORMAL MMHG
T AXIS - MUSE: 58 DEGREES
VENTRICULAR RATE- MUSE: 68 BPM

## 2023-09-21 NOTE — ED PROVIDER NOTES
Patient care was signed out to me by Dr. Waldrop.  Please see her initial ED provider note regarding history of present illness, physical exam, and medical decision making.  In brief patient is a 38-year-old male who had presented with confusion and difficulty completing tasks earlier today.  Symptoms fully resolved.  Pending MRI studies.    MRI studies returned unremarkable.  There was a normal brain MRI as well as normal head MRA and neck MRA.  There was a incidental finding of a polypoid mucosal opacification the right maxillary sinus but he is asymptomatic with no sinus congestion symptoms.  I would refer him back to his primary care provider who may refer to ENT for further management of this.  I would not start antibiotics or antifungals at this time given the lack of symptoms at this point.  Plan for close follow-up in the outpatient setting with primary care.  Feeling asymptomatic at this time.  Discharged home after all questions answered.      MR Brain w/o & w Contrast   Final Result   CONCLUSION:   HEAD MRI:    1.  Normal brain MRI, without acute/subacute infarction, intracranial hemorrhage, mass effect, hydrocephalus, or abnormal enhancement.    2.  Subtotal polypoid mucosal opacification of the right maxillary sinus, which contains a well-circumscribed mildly T1 hyperintense focus measuring 1.8 cm, which may represent superimposed fungal colonization.      HEAD MRA:    1.  Normal Head MRA.   2.  No aneurysm, high flow AVM or significant stenosis identified.      NECK MRA:   1.  No significant stenosis in the neck vessels based on NASCET criteria.   2.  No evidence for dissection or pseudoaneurysm.      MRA Neck (Carotids) wo & w Contrast   Final Result   CONCLUSION:   HEAD MRI:    1.  Normal brain MRI, without acute/subacute infarction, intracranial hemorrhage, mass effect, hydrocephalus, or abnormal enhancement.    2.  Subtotal polypoid mucosal opacification of the right maxillary sinus, which  contains a well-circumscribed mildly T1 hyperintense focus measuring 1.8 cm, which may represent superimposed fungal colonization.      HEAD MRA:    1.  Normal Head MRA.   2.  No aneurysm, high flow AVM or significant stenosis identified.      NECK MRA:   1.  No significant stenosis in the neck vessels based on NASCET criteria.   2.  No evidence for dissection or pseudoaneurysm.      MRA Brain ([place]) wo Contrast   Final Result   CONCLUSION:   HEAD MRI:    1.  Normal brain MRI, without acute/subacute infarction, intracranial hemorrhage, mass effect, hydrocephalus, or abnormal enhancement.    2.  Subtotal polypoid mucosal opacification of the right maxillary sinus, which contains a well-circumscribed mildly T1 hyperintense focus measuring 1.8 cm, which may represent superimposed fungal colonization.      HEAD MRA:    1.  Normal Head MRA.   2.  No aneurysm, high flow AVM or significant stenosis identified.      NECK MRA:   1.  No significant stenosis in the neck vessels based on NASCET criteria.   2.  No evidence for dissection or pseudoaneurysm.      Chest XR,  PA & LAT   Final Result   IMPRESSION: Heart is normal in size. Lungs are clear.      MARISELA MALONE MD            SYSTEM ID:  [id number]          ICD-10-CM    1. Difficulty concentrating  R41.840       2. Altered mental status, unspecified altered mental status type  R41.82              Joel Bergeron MD  09/20/23 1913

## 2023-09-22 ENCOUNTER — OFFICE VISIT (OUTPATIENT)
Dept: FAMILY MEDICINE | Facility: CLINIC | Age: 38
End: 2023-09-22

## 2023-09-22 VITALS
HEART RATE: 93 BPM | SYSTOLIC BLOOD PRESSURE: 122 MMHG | TEMPERATURE: 98 F | DIASTOLIC BLOOD PRESSURE: 82 MMHG | WEIGHT: 251 LBS | HEIGHT: 71 IN | BODY MASS INDEX: 35.14 KG/M2 | OXYGEN SATURATION: 96 %

## 2023-09-22 DIAGNOSIS — R93.0 OPACIFICATION OF RIGHT MAXILLARY SINUS: Primary | ICD-10-CM

## 2023-09-22 PROCEDURE — 99213 OFFICE O/P EST LOW 20 MIN: CPT | Performed by: PHYSICIAN ASSISTANT

## 2023-09-22 NOTE — LETTER
[facility]  [address], [address]  [place]  [address]    September 22, 2023        Nirav Alanap  [address] [address]              To Whom It May Concern:    Nirav is a patient at our clinic seen 9/22/2023. 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     If you have any further questions or problems, please contact our office at [phone].          Mariya Nogueira PA-C

## 2023-09-22 NOTE — PROGRESS NOTES
"CC: Follow-up ER    History:  Nirav developed symptoms 9/20/2023. Had nausea, dizziness and was having difficulty completing tasks, focusing. Dizziness happened with standing after sitting, but no spinning/tilting. Went to , who quickly triaged him to ER, due to stroke concern with patients description of cognitive change. No speech changes, and no cognitive changes evident on exam in ER. Had significant imaging in ER including MRI brain, MRA brain, and MRA neck. Fortunately imaging was clear other than 1.8 cm mass in right maxillary sinus described as \"Subtotal polypoid mucosal opacification of the right maxillary sinus, which contains a well-circumscribed mildly T1 hyperintense focus measuring 1.8 cm, which may represent superimposed fungal colonization.\"     He does get sinus infections on occasion, and had a significant cold 2 weeks prior to ER with congestion, sinus pressure. Currently not having any pain or sinus symptoms.  Did serve in the [place] [employer], but only served locally in [place]. Den[place] exotic travel, travel to [place].    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 Denies any further symptoms since ER.    PMH, MEDICATIONS, ALLERGIES, SOCIAL AND FAMILY HISTORY in [facility] and reviewed by me personally.    ROS negative other than the symptoms noted above in the HPI.      Examination   /82 (BP Location: Right arm, Patient Position: Sitting, Cuff Size: Adult Large)   Pulse 93   Temp 98  F (36.7  C) (Temporal)   Ht 1.803 m (5' 11\")   Wt 113.9 kg (251 lb)   SpO2 96%   BMI 35.01 kg/m       Constitutional: Sitting comfortably, in no acute distress. Vital signs noted  Ears: external canals and TMs free of abnormalities  Nose: patent, without mucosal abnormalities  Mouth and throat: without erythema or lesions of the mucosa  Neck:  no adenopathy, trachea midline and normal to palpation  Cardiovascular:  regular rate and rhythm, no " murmurs, clicks, or gallops  Respiratory:  normal respiratory rate and rhythm, lungs clear to auscultation  NEURO:  cranial nerves 2-12 intact, muscle strength normal, sensation to light touch and pinprick normal, proprioception normal, reflexes normal and symmetric  SKIN: No jaundice/pallor/rash.   Psychiatric: mentation appears normal and affect normal/bright      A/P    ICD-10-CM    1. Opacification of right maxillary sinus  R93.0 Adult ENT  Referral - To a [facility] Location (Use POS/Location)          DISCUSSION:  Reassured by patient's negative work up in the ER, complete resolution of symptoms. Agreed to write letter for him to return to work without restrictions. At this time, did not recommend neurology referral.     Sinus mass:  Recommended pt follow-up with ENT provider. I will submit referral.     follow up visit: As needed    Mariya Nogueira PA-C  [facility]

## 2023-09-22 NOTE — NURSING NOTE
Chief Complaint   Patient presents with    ER F/U     [facility] ER  9/20/23  Confusion/difficulty completing tasks  Normal imaging  No episodes of that since  Needs work note and discuss sinus issue found on imaging

## 2024-06-17 PROBLEM — Z76.89 HEALTH CARE HOME: Status: RESOLVED | Noted: 2021-02-03 | Resolved: 2024-06-17

## 2024-09-22 ENCOUNTER — HEALTH MAINTENANCE LETTER (OUTPATIENT)
Age: 39
End: 2024-09-22